# Patient Record
Sex: FEMALE | Race: WHITE | HISPANIC OR LATINO
[De-identification: names, ages, dates, MRNs, and addresses within clinical notes are randomized per-mention and may not be internally consistent; named-entity substitution may affect disease eponyms.]

---

## 2018-11-13 PROBLEM — Z00.00 ENCOUNTER FOR PREVENTIVE HEALTH EXAMINATION: Status: ACTIVE | Noted: 2018-11-13

## 2019-01-03 ENCOUNTER — APPOINTMENT (OUTPATIENT)
Dept: PULMONOLOGY | Facility: CLINIC | Age: 84
End: 2019-01-03

## 2019-03-18 ENCOUNTER — RX RENEWAL (OUTPATIENT)
Age: 84
End: 2019-03-18

## 2019-04-15 ENCOUNTER — RECORD ABSTRACTING (OUTPATIENT)
Age: 84
End: 2019-04-15

## 2019-04-15 DIAGNOSIS — M81.0 AGE-RELATED OSTEOPOROSIS W/OUT CURRENT PATHOLOGICAL FRACTURE: ICD-10-CM

## 2019-04-15 DIAGNOSIS — I10 ESSENTIAL (PRIMARY) HYPERTENSION: ICD-10-CM

## 2019-04-15 RX ORDER — OMEGA-3/DHA/EPA/FISH OIL 60 MG-90MG
500 CAPSULE ORAL
Refills: 0 | Status: ACTIVE | COMMUNITY

## 2019-04-15 RX ORDER — VIT A/VIT C/VIT E/ZINC/COPPER 4296-226
CAPSULE ORAL
Refills: 0 | Status: ACTIVE | COMMUNITY

## 2019-04-15 RX ORDER — MULTIVIT-MIN/FA/LYCOPEN/LUTEIN .4-300-25
TABLET ORAL
Refills: 0 | Status: ACTIVE | COMMUNITY

## 2019-04-15 RX ORDER — ROSUVASTATIN CALCIUM 10 MG/1
10 TABLET, FILM COATED ORAL
Refills: 0 | Status: ACTIVE | COMMUNITY

## 2019-04-15 RX ORDER — RALOXIFENE HYDROCHLORIDE 60 MG/1
60 TABLET ORAL
Refills: 0 | Status: ACTIVE | COMMUNITY

## 2019-04-15 RX ORDER — OMEPRAZOLE 40 MG/1
40 CAPSULE, DELAYED RELEASE ORAL
Refills: 0 | Status: ACTIVE | COMMUNITY

## 2019-04-15 RX ORDER — IBUPROFEN 200 MG
600 CAPSULE ORAL
Refills: 0 | Status: ACTIVE | COMMUNITY

## 2019-06-12 ENCOUNTER — APPOINTMENT (OUTPATIENT)
Dept: PULMONOLOGY | Facility: CLINIC | Age: 84
End: 2019-06-12
Payer: MEDICARE

## 2019-06-12 VITALS
HEART RATE: 57 BPM | SYSTOLIC BLOOD PRESSURE: 134 MMHG | OXYGEN SATURATION: 97 % | BODY MASS INDEX: 30.36 KG/M2 | HEIGHT: 62 IN | DIASTOLIC BLOOD PRESSURE: 62 MMHG | WEIGHT: 165 LBS

## 2019-06-12 PROCEDURE — 99214 OFFICE O/P EST MOD 30 MIN: CPT

## 2019-06-12 NOTE — PHYSICAL EXAM
[General Appearance - In No Acute Distress] : no acute distress [Elongated Uvula] : elongated uvula [Enlarged Base of the Tongue] : enlargement of the base of the tongue [Erythema] : erythema of the pharynx [IV] : IV [Neck Appearance] : the appearance of the neck was normal [Heart Rate And Rhythm] : heart rate and rhythm were normal [Arterial Pulses Normal] : the arterial pulses were normal [Respiration, Rhythm And Depth] : normal respiratory rhythm and effort [Heart Sounds] : normal S1 and S2 [Abdomen Soft] : soft [Auscultation Breath Sounds / Voice Sounds] : lungs were clear to auscultation bilaterally [Nail Clubbing] : no clubbing of the fingernails [Abnormal Walk] : normal gait [Cyanosis, Localized] : no localized cyanosis [1+ Pitting] : 1+  pitting [Skin Color & Pigmentation] : normal skin color and pigmentation [Cranial Nerves] : cranial nerves 2-12 were intact [Oriented To Time, Place, And Person] : oriented to person, place, and time [Sensation] : the sensory exam was normal to light touch and pinprick [FreeTextEntry1] : + VENOUS STASIS

## 2019-06-12 NOTE — HISTORY OF PRESENT ILLNESS
[Difficulty Breathing During Exertion] : denies dyspnea on exertion [Feelings Of Weakness On Exertion] : denies exercise intolerance [Cough] : improved coughing [Wheezing] : denies wheezing [Chest Pain Or Discomfort] : denies chest pain [Fever] : denies fever [Regional Soft Tissue Swelling Both Lower Extremities] : improved lower extremity edema [2  -  Slight] : 2, slight [Class II - Mild Symptoms and Slight Limitations] : II [Never] : was never a smoker [None] : None [Witnessed Apneas] : witnessed sleep apnea [Awakening With Dry Mouth] : awakening with dry mouth [Frequent Nocturnal Awakening] : frequent nocturnal awakening [Wt Gain ___ Lbs] : no recent weight gain [Wt Loss ___ Lbs] : no recent weight loss [More Frequent Use Needed Recently] : Patient reports no recent increase in frequency of [Oxygen] : the patient uses no supplemental oxygen [FreeTextEntry1] : pt is here with her aide to discuss her bronchiectasis.SHe came back from [place] 3 weeks ago. She Tried decreasing Breo to every other day but her cough returned. Once on the Breo or leg swelling decreased dramatically and her breathing improved. She denies phlegm on a regular basis and denies any chest pain pressure or tightness. She was recently diagnosed with wet MD and the question was whether it was related to her inhalers. Before she left she had her sleep study and is here to discuss the results. She continues to wake up on a regular basis complaining of dry mouth and drinking water before falling back to sleep. There are times she wakes up 4-5 times per night complaining of a horrible dry mouth. She still snores loudly\par

## 2019-06-12 NOTE — REVIEW OF SYSTEMS
[As Noted in HPI] : as noted in HPI [Difficulty Maintaining Sleep] : difficulty maintaining sleep [Snoring] : snoring [Negative] : Pulmonary Hypertension

## 2019-08-29 ENCOUNTER — APPOINTMENT (OUTPATIENT)
Dept: PULMONOLOGY | Facility: CLINIC | Age: 84
End: 2019-08-29
Payer: MEDICARE

## 2019-08-29 VITALS
WEIGHT: 165 LBS | HEIGHT: 62 IN | SYSTOLIC BLOOD PRESSURE: 144 MMHG | DIASTOLIC BLOOD PRESSURE: 60 MMHG | BODY MASS INDEX: 30.36 KG/M2 | HEART RATE: 63 BPM | OXYGEN SATURATION: 94 %

## 2019-08-29 DIAGNOSIS — J45.30 MILD PERSISTENT ASTHMA, UNCOMPLICATED: ICD-10-CM

## 2019-08-29 DIAGNOSIS — J47.1 BRONCHIECTASIS WITH (ACUTE) EXACERBATION: ICD-10-CM

## 2019-08-29 PROCEDURE — 99213 OFFICE O/P EST LOW 20 MIN: CPT

## 2019-08-29 PROCEDURE — 99203 OFFICE O/P NEW LOW 30 MIN: CPT

## 2019-08-29 NOTE — ASSESSMENT
[FreeTextEntry1] : above was discussed at length with the patient and her aide who have an excellent understanding of the issues.

## 2019-08-29 NOTE — HISTORY OF PRESENT ILLNESS
[Difficulty Breathing During Exertion] : denies dyspnea on exertion [Feelings Of Weakness On Exertion] : denies exercise intolerance [Regional Soft Tissue Swelling Both Lower Extremities] : improved lower extremity edema [Cough] : improved coughing [Wheezing] : denies wheezing [Fever] : denies fever [Chest Pain Or Discomfort] : denies chest pain [Wt Gain ___ Lbs] : no recent weight gain [Wt Loss ___ Lbs] : no recent weight loss [Oxygen] : the patient uses no supplemental oxygen [2  -  Slight] : 2, slight [Class II - Mild Symptoms and Slight Limitations] : II [Never] : was never a smoker [More Frequent Use Needed Recently] : Patient reports no recent increase in frequency of [None] : None [Witnessed Apneas] : witnessed sleep apnea [Frequent Nocturnal Awakening] : frequent nocturnal awakening [Awakening With Dry Mouth] : awakening with dry mouth [Date: ___] : Date of most recent diagnostic polysomnogram: [unfilled] [AHI: ___ per hour] : Apnea-hypopnea index:  [unfilled] per hour [Alvaro desatuation%: ___] : Alvaro desaturation:  [unfilled]% [FreeTextEntry1] : She continues on Breo 100 without difficulty. . she is not short of breath unless she walks up steps and then she might cough otherwise her cough has resolved. Her appetite is good but she cannot tolerate CPAP. She has been using nasal saline but hasn't been spitting out and her aid his concerned that she is swallowing it.

## 2019-08-29 NOTE — PHYSICAL EXAM
[General Appearance - In No Acute Distress] : no acute distress [Enlarged Base of the Tongue] : enlargement of the base of the tongue [Elongated Uvula] : elongated uvula [IV] : IV [Erythema] : erythema of the pharynx [Heart Rate And Rhythm] : heart rate and rhythm were normal [Neck Appearance] : the appearance of the neck was normal [Respiration, Rhythm And Depth] : normal respiratory rhythm and effort [Arterial Pulses Normal] : the arterial pulses were normal [Heart Sounds] : normal S1 and S2 [Abdomen Soft] : soft [Auscultation Breath Sounds / Voice Sounds] : lungs were clear to auscultation bilaterally [Abnormal Walk] : normal gait [Nail Clubbing] : no clubbing of the fingernails [FreeTextEntry2] : with compression stockings. [Cyanosis, Localized] : no localized cyanosis [1+ Pitting] : 1+  pitting [Skin Color & Pigmentation] : normal skin color and pigmentation [FreeTextEntry1] : +CVS chnges [Cranial Nerves] : cranial nerves 2-12 were intact [Sensation] : the sensory exam was normal to light touch and pinprick [Oriented To Time, Place, And Person] : oriented to person, place, and time

## 2019-08-29 NOTE — REASON FOR VISIT
[Follow-Up] : a follow-up visit [Bronchiectasis] : bronchiectasis [Formal Caregiver] : formal caregiver

## 2019-09-13 ENCOUNTER — RX RENEWAL (OUTPATIENT)
Age: 84
End: 2019-09-13

## 2020-02-13 ENCOUNTER — RX RENEWAL (OUTPATIENT)
Age: 85
End: 2020-02-13

## 2020-04-14 ENCOUNTER — RX RENEWAL (OUTPATIENT)
Age: 85
End: 2020-04-14

## 2020-05-14 ENCOUNTER — RX RENEWAL (OUTPATIENT)
Age: 85
End: 2020-05-14

## 2020-08-17 ENCOUNTER — RX RENEWAL (OUTPATIENT)
Age: 85
End: 2020-08-17

## 2020-09-02 ENCOUNTER — APPOINTMENT (OUTPATIENT)
Dept: PULMONOLOGY | Facility: CLINIC | Age: 85
End: 2020-09-02
Payer: MEDICARE

## 2020-09-02 VITALS
WEIGHT: 168 LBS | HEART RATE: 63 BPM | DIASTOLIC BLOOD PRESSURE: 60 MMHG | TEMPERATURE: 97.6 F | BODY MASS INDEX: 30.91 KG/M2 | SYSTOLIC BLOOD PRESSURE: 146 MMHG | OXYGEN SATURATION: 96 % | HEIGHT: 62 IN

## 2020-09-02 PROCEDURE — 99214 OFFICE O/P EST MOD 30 MIN: CPT

## 2020-09-02 NOTE — ASSESSMENT
[FreeTextEntry1] : above was discussed at length with the patient and her aide who have an excellent understanding of the issues. \par

## 2020-09-02 NOTE — PHYSICAL EXAM
[No Acute Distress] : no acute distress [Low Lying Soft Palate] : low lying soft palate [IV] : Mallampati Class: IV [Elongated Uvula] : elongated uvula [No Neck Mass] : no neck mass [Normal Appearance] : normal appearance [Murmur ___ / 6] : murmur [unfilled] / 6 [Normal S1, S2] : normal s1, s2 [Irregular rate/rhythm] : irregular rate/rhythm [Clear to Auscultation Bilaterally] : clear to auscultation bilaterally [No Abnormalities] : no abnormalities [No Resp Distress] : no resp distress [Normal Gait] : normal gait [Benign] : benign [No Clubbing] : no clubbing [No Cyanosis] : no cyanosis [No Edema] : no edema [Normal Color/ Pigmentation] : normal color/ pigmentation [FROM] : FROM [Oriented x3] : oriented x3 [No Focal Deficits] : no focal deficits [Normal Affect] : normal affect

## 2020-09-02 NOTE — HISTORY OF PRESENT ILLNESS
[Never] : never [TextBox_4] : Patient returns for followup of her bronchiectasis. Since starting Breo her cough has resolved but she complains of constant hoarseness. According to her full-time aide she has difficulty inhaling deeply with the device so she takes almost staccato breaths. She denies shortness of breath chest pain pressure or tightness. She fights having to do nasal saline but the aide does it but she can't get her to put her head down. She is trying to lose weight. [Obstructive Sleep Apnea] : obstructive sleep apnea [TextBox_100] : 12/7/2018 [TextBox_108] : 20 [TextBox_165] : unable to tolerate the machine. [TextBox_116] : 83

## 2020-10-26 ENCOUNTER — APPOINTMENT (OUTPATIENT)
Dept: NEUROLOGY | Facility: CLINIC | Age: 85
End: 2020-10-26
Payer: MEDICARE

## 2020-10-26 VITALS
TEMPERATURE: 97.3 F | WEIGHT: 160 LBS | SYSTOLIC BLOOD PRESSURE: 152 MMHG | DIASTOLIC BLOOD PRESSURE: 78 MMHG | BODY MASS INDEX: 28.71 KG/M2 | HEIGHT: 62.5 IN | HEART RATE: 56 BPM

## 2020-10-26 DIAGNOSIS — R29.90 UNSPECIFIED SYMPTOMS AND SIGNS INVOLVING THE NERVOUS SYSTEM: ICD-10-CM

## 2020-10-26 PROCEDURE — 99205 OFFICE O/P NEW HI 60 MIN: CPT

## 2020-10-28 NOTE — ASSESSMENT
[FreeTextEntry1] : Ksenia Barcenas is a 89 year old woman with multiple issues. \par \par Possible complex partial seizures- Extended routine EEG to characterize these events. Pt refused ambulatory EEG.\par \par Parkinsonism - possible Idiopathic Parkinson's Disease.\par I recommend PT for gait training and fall prevention.\par I do not recommend starting any medication at this time. Will continue to monitor clinically.\par \par Follow up in 3 months.

## 2020-10-28 NOTE — CONSULT LETTER
[Dear  ___] : Dear  [unfilled], [FreeTextEntry1] : I had the pleasure of evaluating your patient, DIAMOND VEGA. Please see the assessment section below for a summary of my diagnostic impression and plan.\par \par Thank you very much for allowing me to participate in the care of this patient. If you have any questions, please do not hesitate to contact me. \par \par Sincerely,\par \par Lorena Stinson MD\par

## 2020-10-28 NOTE — REASON FOR VISIT
[Consultation] : a consultation visit [FreeTextEntry1] : Frequent fall,unresponsive for a few seconds

## 2020-10-28 NOTE — PHYSICAL EXAM
[FreeTextEntry1] : Physical examination \par General: No acute distress, Awake, Alert.   \par \par Mental status \par Awake, alert, and oriented to person, time and place, Normal attention span and concentration, Recent and remote memory intact, Language intact, Fund of knowledge intact.   \par \par Cranial Nerves \par II: VFF  \par III, IV, VI: PERRL, EOMI.   \par V: Facial sensation is normal B/L.   \par VII: Facial strength is normal B/L. \par \par \par VIII: Decreased hearing, bilaterally. \par \par IX, X: Palate is midline and elevates symmetrically.   \par XI: Trapezius normal strength.   \par XII: Tongue midline without atrophy or fasciculations. \par \par Motor exam  \par Muscle tone - cogwheel arms. \par No atrophy or fasciculations \par Muscle Strength: arms and legs, proximal and distal flexors and extensors are normal \par \par Moderate bradykinesia. \par \par No UE drift.\par \par Reflexes \par Diffuse hyporeflexia\par \par \par Plantars right: mute.   \par Plantars left: mute.   \par \par Absent ankle jerks.   \par \par Coordination \par Slow, no ataxia - FNF, JOSEY, HKS. \par \par Sensory \par Intact sensation to vibration and cold.\par \par Gait \par slow, short stride, shuffling gait. no arm swing. \par

## 2020-10-28 NOTE — HISTORY OF PRESENT ILLNESS
[FreeTextEntry1] : History obtained from patient and live-in aide. \par \par Ksenia Barcenas is a 89 year old woman with a history of hypertension, hyperlipidemia, and bronchiectasis with chronic gait instability x 3 years presenting for a consultation for frequent falls and episodes of inability to speak. \par \par As per her aide, she witnessed staring episodes that started a few years ago but the most recent was three weeks ago and this past Sunday. The episode is described as eyes open and staring lasting approximately one minute with diaphoresis then she was back to her normal self. The patient recalls the episode and hearing her friend but was unable to respond with drooling from the right side of her mouth. She also is fatigued for about 10 minutes afterward.  There was no jerking movements or tongue biting noted. She does endorse that her balance is worse now but has been a chronic issue for three years. She is having frequent falls approximately twice per week. She is doing home therapy exercises and does not want to do rehab physical therapy due to her risk for COVID-19 and bronchiectasis.  Her handwriting has also become smaller and messier. There was no change in speech, facial droop, or weakness on one side noted.\par \par The remaining neurological review of systems is negative.

## 2020-11-02 ENCOUNTER — APPOINTMENT (OUTPATIENT)
Dept: NEUROLOGY | Facility: CLINIC | Age: 85
End: 2020-11-02
Payer: MEDICARE

## 2020-11-02 PROCEDURE — 95816 EEG AWAKE AND DROWSY: CPT

## 2020-11-11 ENCOUNTER — APPOINTMENT (OUTPATIENT)
Dept: THORACIC SURGERY | Facility: HOSPITAL | Age: 85
End: 2020-11-11

## 2020-11-13 ENCOUNTER — NON-APPOINTMENT (OUTPATIENT)
Age: 85
End: 2020-11-13

## 2021-01-10 ENCOUNTER — RX RENEWAL (OUTPATIENT)
Age: 86
End: 2021-01-10

## 2021-02-15 ENCOUNTER — RX RENEWAL (OUTPATIENT)
Age: 86
End: 2021-02-15

## 2021-03-16 ENCOUNTER — RX RENEWAL (OUTPATIENT)
Age: 86
End: 2021-03-16

## 2021-04-16 ENCOUNTER — RX RENEWAL (OUTPATIENT)
Age: 86
End: 2021-04-16

## 2021-05-16 ENCOUNTER — RX RENEWAL (OUTPATIENT)
Age: 86
End: 2021-05-16

## 2021-06-27 NOTE — REASON FOR VISIT
No [Follow-Up] : a follow-up visit [Friend] : friend [FreeTextEntry2] : Snoring, Bronchiectasis, Obesity

## 2021-07-07 ENCOUNTER — RX RENEWAL (OUTPATIENT)
Age: 86
End: 2021-07-07

## 2021-07-26 ENCOUNTER — APPOINTMENT (OUTPATIENT)
Dept: PULMONOLOGY | Facility: CLINIC | Age: 86
End: 2021-07-26
Payer: MEDICARE

## 2021-07-26 VITALS
BODY MASS INDEX: 26.91 KG/M2 | SYSTOLIC BLOOD PRESSURE: 132 MMHG | OXYGEN SATURATION: 95 % | HEART RATE: 83 BPM | WEIGHT: 150 LBS | DIASTOLIC BLOOD PRESSURE: 66 MMHG | TEMPERATURE: 98.6 F | HEIGHT: 62.5 IN

## 2021-07-26 DIAGNOSIS — E66.9 OBESITY, UNSPECIFIED: ICD-10-CM

## 2021-07-26 PROCEDURE — 99214 OFFICE O/P EST MOD 30 MIN: CPT

## 2021-07-26 RX ORDER — AMLODIPINE BESYLATE 5 MG/1
5 TABLET ORAL
Refills: 0 | Status: ACTIVE | COMMUNITY

## 2021-07-26 RX ORDER — ASPIRIN 81 MG
81 TABLET, DELAYED RELEASE (ENTERIC COATED) ORAL
Refills: 0 | Status: ACTIVE | COMMUNITY

## 2021-07-26 RX ORDER — CHLORTHALIDONE 25 MG/1
25 TABLET ORAL
Refills: 0 | Status: ACTIVE | COMMUNITY

## 2021-07-26 RX ORDER — DIGOXIN 125 MCG
0.12 TABLET ORAL
Refills: 0 | Status: ACTIVE | COMMUNITY

## 2021-07-26 RX ORDER — AMLODIPINE BESYLATE VALSARTAN HYDROCHLOROTHIAZIDE 5; 25; 160 MG/1; MG/1; MG/1
5-160-25 TABLET, FILM COATED ORAL
Refills: 0 | Status: DISCONTINUED | COMMUNITY
End: 2021-07-26

## 2021-07-26 RX ORDER — QUETIAPINE FUMARATE 25 MG/1
25 TABLET ORAL
Refills: 0 | Status: ACTIVE | COMMUNITY

## 2021-07-26 RX ORDER — CANDESARTAN CILEXETIL 32 MG/1
32 TABLET ORAL
Refills: 0 | Status: ACTIVE | COMMUNITY

## 2021-07-26 RX ORDER — DONEPEZIL HYDROCHLORIDE 5 MG/1
5 TABLET ORAL
Refills: 0 | Status: ACTIVE | COMMUNITY

## 2021-07-28 NOTE — ASSESSMENT
[FreeTextEntry1] : above was discussed at length with the patient and her daughter who have an excellent understanding of the issues. \par

## 2021-07-28 NOTE — PHYSICAL EXAM
[No Acute Distress] : no acute distress [Low Lying Soft Palate] : low lying soft palate [Elongated Uvula] : elongated uvula [IV] : Mallampati Class: IV [Normal Appearance] : normal appearance [No Neck Mass] : no neck mass [Murmur ___ / 6] : murmur [unfilled] / 6 [Normal S1, S2] : normal s1, s2 [Irregular rate/rhythm] : irregular rate/rhythm [No Resp Distress] : no resp distress [Clear to Auscultation Bilaterally] : clear to auscultation bilaterally [No Abnormalities] : no abnormalities [Benign] : benign [Normal Gait] : normal gait [No Clubbing] : no clubbing [No Cyanosis] : no cyanosis [FROM] : FROM [Normal Color/ Pigmentation] : normal color/ pigmentation [No Focal Deficits] : no focal deficits [Oriented x3] : oriented x3 [Normal Affect] : normal affect [TextBox_11] : improved nasal passages, + erythema w/o thrush [TextBox_105] : trace edema

## 2021-07-28 NOTE — HISTORY OF PRESENT ILLNESS
[Never] : never [Obstructive Sleep Apnea] : obstructive sleep apnea [TextBox_4] : Patient is here with her daughter on a f/u for bronchiectasis. She is feeling fine and her breathing has been good. She had a cold for a few weeks; she took Mucinex and Robitussin DM and she recovered. She currently has hoarseness and her HHA and her daughter believe it is either from Parkinson's or from Breo. The HHA points out that the vocal issues began around the same time her mother started Breo. Her daughter says her mother's voice actually improved slightly when she was coughing during her cold. She denies throat pain. She has lost weight.  [TextBox_100] : 12/7/2018 [TextBox_108] : 20 [TextBox_116] : 83 [TextBox_165] : unable to tolerate the machine.

## 2021-08-26 ENCOUNTER — APPOINTMENT (OUTPATIENT)
Dept: NEUROLOGY | Facility: CLINIC | Age: 86
End: 2021-08-26
Payer: MEDICARE

## 2021-08-26 VITALS
HEART RATE: 71 BPM | TEMPERATURE: 97.6 F | HEIGHT: 62.5 IN | SYSTOLIC BLOOD PRESSURE: 122 MMHG | WEIGHT: 150 LBS | BODY MASS INDEX: 26.91 KG/M2 | DIASTOLIC BLOOD PRESSURE: 69 MMHG

## 2021-08-26 DIAGNOSIS — G20 PARKINSON'S DISEASE: ICD-10-CM

## 2021-08-26 DIAGNOSIS — R13.10 DYSPHAGIA, UNSPECIFIED: ICD-10-CM

## 2021-08-26 PROCEDURE — 99214 OFFICE O/P EST MOD 30 MIN: CPT

## 2021-08-30 PROBLEM — G20 PARKINSONISM: Status: ACTIVE | Noted: 2020-10-28

## 2021-08-30 NOTE — DATA REVIEWED
[de-identified] : 11/12/20 Routine eeg- normal \par 1/14/21 EEG (outside study)- this electroencephalogram is normal for age and does not reveal evidence of underlying epileptiform activity.  [de-identified] : 8/9/20 CT head-normal. \par 1/5/21 Mariela scan- see scanned document.

## 2021-08-30 NOTE — CONSULT LETTER
[Dear  ___] : Dear  [unfilled], [FreeTextEntry1] : I had the pleasure of evaluating your patient, DIAMOND VEGA. Please see the assessment section below for a summary of my diagnostic impression and plan.\par \par Thank you very much for allowing me to participate in the care of this patient. If you have any questions, please do not hesitate to contact me. \par \par Sincerely,\par \par Lorena Stinson MD\par Nadja Coombs N.P.\par

## 2021-08-30 NOTE — ASSESSMENT
[FreeTextEntry1] : Ksenia Barcenas is a 89 year old woman with multiple issues. 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 \par I do not recommend starting any medication at this time. Will continue to monitor clinically.   In the past she tried Sinemet which caused increased confusion.  \par Second opinion with Dr. Judy Timmons- movement disorder specialist. \par \par Continue Donepezil 5mg. \par \par \par I discussed in detail with the patient and caregiver the diagnosis, prognosis, treatment plan and answered all of their questions.\par \par

## 2021-08-30 NOTE — HISTORY OF PRESENT ILLNESS
[FreeTextEntry1] : History obtained from patient and live-in aide. \par \par Ksenia Barcenas is a 89 year old woman with a history of hypertension, hyperlipidemia, and bronchiectasis with chronic gait instability x 3 years presenting for a follow for frequent falls and episodes previously of inability to speak. \par Since her last appointment in November 2020, she was seen by a previous neurologist in [place] for Parkinsonism. Ms. Barcenas was started on Donepezil 5mg which helped her agitation and confusion. She tried Sinemet which caused increased confusion. This medication was discontinued. She also is taking 12.5-25mg of Seroquel nightly which her aide reports is helping with agitation. \par Since her last appointment hoarseness in her voice has been noted (also started Breo) ? Hypophonia. She was unable to complete neuropsychological testing due to her decreased hearing. She has had difficulty walking with no change over many months, uses a walker for assist. She fell in [place] and injured the right side of her neck. She is using heat and Aspercreme, denies any pain. Her aide also notes that she is drooling and coughing on water. She has not had a swallow evaluation. Her handwriting is unreadable. She had two episodes of hypotension while walking over an hour. She was diaphoretic and her aide checked her blood pressure at that time, systolic in the 80s or lower. She is not sleepy during the day. \par \par The remaining neurological review of systems is negative.\par \par 11/12/20\par History obtained from patient and live-in aide. \par \par Ksenia Barcenas is a 89 year old woman with a history of hypertension, hyperlipidemia, and bronchiectasis with chronic gait instability x 3 years presenting for a consultation for frequent falls and episodes of inability to speak. \par \par As per her aide, she witnessed staring episodes that started a few years ago but the most recent was three weeks ago and this past Sunday. The episode is described as eyes open and staring lasting approximately one minute with diaphoresis then she was back to her normal self. The patient recalls the episode and hearing her friend but was unable to respond with drooling from the right side of her mouth. She also is fatigued for about 10 minutes afterward.  There was no jerking movements or tongue biting noted. She does endorse that her balance is worse now but has been a chronic issue for three years. She is having frequent falls approximately twice per week. She is doing home therapy exercises and does not want to do rehab physical therapy due to her risk for COVID-19 and bronchiectasis.  Her handwriting has also become smaller and messier. There was no change in speech, facial droop, or weakness on one side noted.\par \par The remaining neurological review of systems is negative.

## 2021-08-30 NOTE — PHYSICAL EXAM
[FreeTextEntry1] : Physical examination \par General: No acute distress, Awake, Alert.   \par other: hypophonia. \par \par Mental status \par Awake, alert, date: August 23, 2021. \par President Sang\par 4Q$1\par 7Q$1.75\par \par Cranial Nerves \par II: VFF  \par III, IV, VI: PERRL, EOMI EXCEPT Downward gaze impaired.  \par V: Facial sensation is normal B/L.   \par VII: Facial strength is normal B/L. \par \par \par VIII: Significant Decreased hearing, bilaterally. \par \par IX, X: Palate is midline and elevates symmetrically.   \par XI: Trapezius normal strength.   \par XII: Tongue midline without atrophy or fasciculations. \par \par Motor exam  \par Muscle tone - cogwheel arms. \par No atrophy or fasciculations \par Muscle Strength: arms and legs, proximal and distal flexors and extensors are normal \par \par Moderate bradykinesia. Right greater than left slow RSM.\par Decreased amplitude. \par \par No UE drift.\par \par Reflexes \par Diffuse hyporeflexia\par \par \par Plantars right: mute.   \par Plantars left: mute.   \par \par Absent ankle jerks.   \par \par Coordination \par Slow, no ataxia - FNF, JOSEY, HKS. \par \par Sensory \par Intact sensation to vibration and cold.\par \par Gait \par slow, short stride, shuffling gait. no arm swing. \par difficultly with standing and initiating gait.

## 2021-09-07 ENCOUNTER — RX RENEWAL (OUTPATIENT)
Age: 86
End: 2021-09-07

## 2021-12-27 ENCOUNTER — RX RENEWAL (OUTPATIENT)
Age: 86
End: 2021-12-27

## 2021-12-27 RX ORDER — DONEPEZIL HYDROCHLORIDE 5 MG/1
5 TABLET ORAL
Qty: 90 | Refills: 0 | Status: ACTIVE | COMMUNITY
Start: 2021-08-26 | End: 1900-01-01

## 2022-02-24 ENCOUNTER — RX RENEWAL (OUTPATIENT)
Age: 87
End: 2022-02-24

## 2022-07-10 ENCOUNTER — TRANSCRIPTION ENCOUNTER (OUTPATIENT)
Age: 87
End: 2022-07-10

## 2022-08-03 ENCOUNTER — APPOINTMENT (OUTPATIENT)
Dept: NEUROLOGY | Facility: CLINIC | Age: 87
End: 2022-08-03

## 2022-08-22 ENCOUNTER — APPOINTMENT (OUTPATIENT)
Dept: PULMONOLOGY | Facility: CLINIC | Age: 87
End: 2022-08-22

## 2022-08-22 VITALS
BODY MASS INDEX: 25.84 KG/M2 | DIASTOLIC BLOOD PRESSURE: 60 MMHG | SYSTOLIC BLOOD PRESSURE: 144 MMHG | WEIGHT: 144 LBS | TEMPERATURE: 98.6 F | OXYGEN SATURATION: 95 % | HEIGHT: 62.5 IN | HEART RATE: 65 BPM

## 2022-08-22 DIAGNOSIS — S72.002D FRACTURE OF UNSPECIFIED PART OF NECK OF LEFT FEMUR, SUBSEQUENT ENCOUNTER FOR CLOSED FRACTURE WITH ROUTINE HEALING: ICD-10-CM

## 2022-08-22 DIAGNOSIS — R49.0 DYSPHONIA: ICD-10-CM

## 2022-08-22 DIAGNOSIS — J30.89 OTHER ALLERGIC RHINITIS: ICD-10-CM

## 2022-08-22 DIAGNOSIS — E66.3 OVERWEIGHT: ICD-10-CM

## 2022-08-22 PROCEDURE — 99214 OFFICE O/P EST MOD 30 MIN: CPT

## 2022-08-24 PROBLEM — J30.89 NON-SEASONAL ALLERGIC RHINITIS DUE TO OTHER ALLERGIC TRIGGER: Status: ACTIVE | Noted: 2019-04-15

## 2022-08-24 PROBLEM — E66.3 OVERWEIGHT (BMI 25.0-29.9): Status: ACTIVE | Noted: 2021-07-26

## 2022-08-24 PROBLEM — S72.002D CLOSED FRACTURE OF LEFT HIP WITH ROUTINE HEALING, SUBSEQUENT ENCOUNTER: Status: ACTIVE | Noted: 2022-08-22

## 2022-08-24 PROBLEM — R49.0 HOARSENESS: Status: ACTIVE | Noted: 2020-09-02

## 2022-08-24 NOTE — HISTORY OF PRESENT ILLNESS
[Never] : never [Obstructive Sleep Apnea] : obstructive sleep apnea [TextBox_4] : Patient returns w/ her daughter on a f/u for bronchiectasis. Daughter reports the patient suffered a fall in July and fractured her hip. Patient states she has no trouble breathing. She is doing better w/ Trelegy 200 but is no longer using APAP. Patient developed a cough after flying to [place] but the daughter notes the cough is somewhat improved now. Patient denies CP. She is significantly hoarse/ nearly aphonic due to her Parkinson's. \par  [TextBox_100] : 12/7/2018 [TextBox_108] : 20 [TextBox_116] : 83 [TextBox_165] : unable to tolerate the machine.

## 2022-08-24 NOTE — ASSESSMENT
Recommend only taking Lovenox once daily rather than twice.  Continue Coumadin and INR checks.  Recheck if cough not improved.   [FreeTextEntry1] : above was discussed at length with the patient and her daughter who have an excellent understanding of the issues. \par

## 2022-08-24 NOTE — PHYSICAL EXAM
[No Acute Distress] : no acute distress [Low Lying Soft Palate] : low lying soft palate [Elongated Uvula] : elongated uvula [IV] : Mallampati Class: IV [Normal Appearance] : normal appearance [No Neck Mass] : no neck mass [Normal S1, S2] : normal s1, s2 [Irregular rate/rhythm] : irregular rate/rhythm [No Resp Distress] : no resp distress [No Abnormalities] : no abnormalities [Benign] : benign [Normal Gait] : normal gait [No Clubbing] : no clubbing [No Cyanosis] : no cyanosis [No Edema] : no edema [Normal Color/ Pigmentation] : normal color/ pigmentation [No Focal Deficits] : no focal deficits [Oriented x3] : oriented x3 [Normal Affect] : normal affect [Normal Oropharynx] : normal oropharynx [TextBox_11] : crowded o/p, no thrush [TextBox_54] : 3/6 systolic murmur best heard across the precordium, occasional irregular beat  [TextBox_68] : slightly diminished BS at the bases [TextBox_105] : severe decreased ROM in LLE

## 2022-08-31 ENCOUNTER — RX RENEWAL (OUTPATIENT)
Age: 87
End: 2022-08-31

## 2022-12-12 RX ORDER — FLUTICASONE FUROATE AND VILANTEROL TRIFENATATE 100; 25 UG/1; UG/1
100-25 POWDER RESPIRATORY (INHALATION)
Qty: 1 | Refills: 3 | Status: ACTIVE | COMMUNITY
Start: 2019-03-18 | End: 1900-01-01

## 2023-06-05 ENCOUNTER — APPOINTMENT (OUTPATIENT)
Dept: PULMONOLOGY | Facility: CLINIC | Age: 88
End: 2023-06-05
Payer: MEDICARE

## 2023-06-05 VITALS
OXYGEN SATURATION: 96 % | HEIGHT: 62 IN | TEMPERATURE: 96.7 F | BODY MASS INDEX: 26.5 KG/M2 | HEART RATE: 73 BPM | SYSTOLIC BLOOD PRESSURE: 140 MMHG | DIASTOLIC BLOOD PRESSURE: 60 MMHG | WEIGHT: 144 LBS

## 2023-06-05 PROCEDURE — 99214 OFFICE O/P EST MOD 30 MIN: CPT

## 2023-06-05 NOTE — HISTORY OF PRESENT ILLNESS
[TextBox_4] : 89 year old female with mild bronchiectasis based on a CT report from 2018 and GINNY came for f/u.\par Last seen by Dr Abbasi in 2022\par CXR seen, my read: no infiltrates.\par No PFT's in the medical record.\par According to Dr Abbasi's notes she is on Trelegy with improvement. Her main complaint was cough.\par Had one episode of pneumonia in December and she recovered with ABX. She was hospitalized for few days\par She is not on CPAP, did not tolerate it.\par Has post nasal drip as well.\par Has tried several nasal sprays but not normal saline\par No dyspnea, no wheezing\par

## 2023-06-05 NOTE — REASON FOR VISIT
[Sleep Apnea] : sleep apnea [Bronchiectasis] : bronchiectasis Improvement of Activities of Daily Living Physical Therapy /Occupational Therapy  Total Hip Protocol   - Ambulation  - Transfers   - Stairs   - ADLs (activities of daily living)    Posterior Total Hip Replacement precautions for 4 weeks  - Abduction pillow   - High hip chair for stiiting  - No internal rotation,   - No crossing legs   - No sleeping on opposite sides  - Ice packs to hip following Physical Therapy   Ice packs to hip for 30 min. every 3 hours and after physical therapy.   Keep incision clean and dry. May shower 5 days after surgery if no drainage from incision.  Prineo tape/suture removal on/near after Post Op Day # 14 in rehab facility / Surgeon's office. - Call your doctor if you experience:  • An increase in pain not controlled by pain medication or change in activity or  position.  • Temperature greater than 101° F.  • Redness, increased swelling or foul smelling drainage from or around the  incision.  • Numbness, tingling or a change in color or temperature of the operative extremity.  • Call your doctor immediately if you experience chest pain, shortness of breath or calf pain.  Follow all verbal and written instructions. Take medications as prescribed. DO NOT drive, operate machinery, and/or make important decisions while on prescription pain medication. DO NOT hesitate to call Doctor's office with questions or concerns.

## 2023-06-05 NOTE — PHYSICAL EXAM
[No Acute Distress] : no acute distress [Normal Appearance] : normal appearance [Normal Rate/Rhythm] : normal rate/rhythm [Normal S1, S2] : normal s1, s2 [No Resp Distress] : no resp distress [Clear to Auscultation Bilaterally] : clear to auscultation bilaterally [No Abnormalities] : no abnormalities [No Focal Deficits] : no focal deficits [Oriented x3] : oriented x3 [Normal Affect] : normal affect

## 2023-11-30 ENCOUNTER — APPOINTMENT (OUTPATIENT)
Dept: PULMONOLOGY | Facility: CLINIC | Age: 88
End: 2023-11-30
Payer: MEDICARE

## 2023-11-30 VITALS
WEIGHT: 144 LBS | OXYGEN SATURATION: 96 % | HEART RATE: 70 BPM | SYSTOLIC BLOOD PRESSURE: 149 MMHG | TEMPERATURE: 93 F | HEIGHT: 62 IN | DIASTOLIC BLOOD PRESSURE: 58 MMHG | BODY MASS INDEX: 26.5 KG/M2

## 2023-11-30 DIAGNOSIS — G47.33 OBSTRUCTIVE SLEEP APNEA (ADULT) (PEDIATRIC): ICD-10-CM

## 2023-11-30 DIAGNOSIS — J47.9 BRONCHIECTASIS, UNCOMPLICATED: ICD-10-CM

## 2023-11-30 PROCEDURE — 99214 OFFICE O/P EST MOD 30 MIN: CPT

## 2024-04-08 RX ORDER — FLUTICASONE FUROATE, UMECLIDINIUM BROMIDE AND VILANTEROL TRIFENATATE 100; 62.5; 25 UG/1; UG/1; UG/1
100-62.5-25 POWDER RESPIRATORY (INHALATION)
Qty: 1 | Refills: 5 | Status: DISCONTINUED | COMMUNITY
Start: 2022-07-19 | End: 2024-04-08

## 2024-04-09 RX ORDER — FLUTICASONE FUROATE, UMECLIDINIUM BROMIDE AND VILANTEROL TRIFENATATE 100; 62.5; 25 UG/1; UG/1; UG/1
100-62.5-25 POWDER RESPIRATORY (INHALATION)
Qty: 60 | Refills: 0 | Status: ACTIVE | COMMUNITY
Start: 2024-04-08 | End: 1900-01-01

## 2024-09-04 ENCOUNTER — APPOINTMENT (OUTPATIENT)
Dept: PULMONOLOGY | Facility: CLINIC | Age: 89
End: 2024-09-04
Payer: MEDICARE

## 2024-09-04 VITALS
TEMPERATURE: 97 F | BODY MASS INDEX: 26.5 KG/M2 | DIASTOLIC BLOOD PRESSURE: 51 MMHG | HEART RATE: 69 BPM | HEIGHT: 62 IN | SYSTOLIC BLOOD PRESSURE: 144 MMHG | WEIGHT: 144 LBS | OXYGEN SATURATION: 96 %

## 2024-09-04 DIAGNOSIS — G47.33 OBSTRUCTIVE SLEEP APNEA (ADULT) (PEDIATRIC): ICD-10-CM

## 2024-09-04 DIAGNOSIS — J47.9 BRONCHIECTASIS, UNCOMPLICATED: ICD-10-CM

## 2024-09-04 DIAGNOSIS — R09.82 POSTNASAL DRIP: ICD-10-CM

## 2024-09-04 PROCEDURE — 99214 OFFICE O/P EST MOD 30 MIN: CPT

## 2024-09-04 RX ORDER — AZELASTINE HYDROCHLORIDE 137 UG/1
0.1 SPRAY, METERED NASAL
Qty: 1 | Refills: 6 | Status: ACTIVE | COMMUNITY
Start: 2024-09-04 | End: 1900-01-01

## 2024-09-04 NOTE — REASON FOR VISIT
Your pain is most consistent with frozen shoulder  Treatment is physical therapy  Home PT exercises below to start  Pain control with meloxicam  Take one tablet by mouth once daily with food for 7 days and then once daily as needed afterward. Do not take other NSAIDs at the same time (ibuprofen, advil, motrin, naproxen, naprosyn, aleve).  Heat or ice for 20 minutes at a time, whichever feels better  People generally like heat before activity and ice after  Let pain be your guide for activity  An increase in pain of 4 or more points on the 0-10 pain scale is a sign to pull back from that activity  Follow-up in late September after your next round of labs with Dr. Dejesus  Next steps may include:  Re-evaluation, possible injection    YouTube Exercises for Frozen Shoulder:   [email or web address]/[email or web address]     [Follow-Up] : a follow-up visit [Sleep Apnea] : sleep apnea [Bronchiectasis] : bronchiectasis

## 2024-09-04 NOTE — PHYSICAL EXAM
[No Acute Distress] : no acute distress [Normal Appearance] : normal appearance [Normal S1, S2] : normal s1, s2 [No Resp Distress] : no resp distress [Clear to Auscultation Bilaterally] : clear to auscultation bilaterally [No Clubbing] : no clubbing [No Focal Deficits] : no focal deficits

## 2024-09-04 NOTE — HISTORY OF PRESENT ILLNESS
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 2023 She is on Trelegy  She coughs especially at night and in am. She uses Robitussin for the cough. Does not use ant nasal sprays No hospitalizations since the last visit Has the same dyspnea on exertion No smoker No hemoptysis She does not want to take nebs

## 2025-09-10 ENCOUNTER — APPOINTMENT (OUTPATIENT)
Dept: PULMONOLOGY | Facility: CLINIC | Age: OVER 89
End: 2025-09-10
Payer: MEDICARE

## 2025-09-10 VITALS
SYSTOLIC BLOOD PRESSURE: 120 MMHG | HEART RATE: 71 BPM | BODY MASS INDEX: 26.5 KG/M2 | WEIGHT: 144 LBS | DIASTOLIC BLOOD PRESSURE: 60 MMHG | OXYGEN SATURATION: 95 % | HEIGHT: 62 IN

## 2025-09-10 DIAGNOSIS — R09.82 POSTNASAL DRIP: ICD-10-CM

## 2025-09-10 DIAGNOSIS — J47.9 BRONCHIECTASIS, UNCOMPLICATED: ICD-10-CM

## 2025-09-10 DIAGNOSIS — G47.33 OBSTRUCTIVE SLEEP APNEA (ADULT) (PEDIATRIC): ICD-10-CM

## 2025-09-10 PROCEDURE — 99214 OFFICE O/P EST MOD 30 MIN: CPT

## 2025-09-10 PROCEDURE — G2211 COMPLEX E/M VISIT ADD ON: CPT
